# Patient Record
Sex: MALE | Race: WHITE
[De-identification: names, ages, dates, MRNs, and addresses within clinical notes are randomized per-mention and may not be internally consistent; named-entity substitution may affect disease eponyms.]

---

## 2019-04-04 ENCOUNTER — HOSPITAL ENCOUNTER (EMERGENCY)
Dept: HOSPITAL 92 - ERS | Age: 84
Discharge: HOME | End: 2019-04-04
Payer: MEDICARE

## 2019-04-04 DIAGNOSIS — Z86.73: ICD-10-CM

## 2019-04-04 DIAGNOSIS — J44.9: ICD-10-CM

## 2019-04-04 DIAGNOSIS — J44.1: Primary | ICD-10-CM

## 2019-04-04 DIAGNOSIS — Z79.899: ICD-10-CM

## 2019-04-04 DIAGNOSIS — Z79.82: ICD-10-CM

## 2019-04-04 LAB
ALBUMIN SERPL BCG-MCNC: 3.5 G/DL (ref 3.4–4.8)
ALP SERPL-CCNC: 89 U/L (ref 40–150)
ALT SERPL W P-5'-P-CCNC: 23 U/L (ref 8–55)
ANION GAP SERPL CALC-SCNC: 11 MMOL/L (ref 10–20)
AST SERPL-CCNC: 30 U/L (ref 5–34)
BASOPHILS # BLD AUTO: 0 THOU/UL (ref 0–0.2)
BASOPHILS NFR BLD AUTO: 0.2 % (ref 0–1)
BILIRUB SERPL-MCNC: 0.8 MG/DL (ref 0.2–1.2)
BUN SERPL-MCNC: 18 MG/DL (ref 8.4–25.7)
CALCIUM SERPL-MCNC: 8.9 MG/DL (ref 7.8–10.44)
CHLORIDE SERPL-SCNC: 109 MMOL/L (ref 98–107)
CO2 SERPL-SCNC: 24 MMOL/L (ref 23–31)
CREAT CL PREDICTED SERPL C-G-VRATE: 0 ML/MIN (ref 70–130)
EOSINOPHIL # BLD AUTO: 0.7 THOU/UL (ref 0–0.7)
EOSINOPHIL NFR BLD AUTO: 11.5 % (ref 0–10)
GLOBULIN SER CALC-MCNC: 3 G/DL (ref 2.4–3.5)
GLUCOSE SERPL-MCNC: 93 MG/DL (ref 83–110)
HGB BLD-MCNC: 12.9 G/DL (ref 14–18)
LYMPHOCYTES # BLD: 0.9 THOU/UL (ref 1.2–3.4)
LYMPHOCYTES NFR BLD AUTO: 15.6 % (ref 21–51)
MCH RBC QN AUTO: 30.9 PG (ref 27–31)
MCV RBC AUTO: 95.2 FL (ref 78–98)
MONOCYTES # BLD AUTO: 0.7 THOU/UL (ref 0.11–0.59)
MONOCYTES NFR BLD AUTO: 12.6 % (ref 0–10)
NEUTROPHILS # BLD AUTO: 3.4 THOU/UL (ref 1.4–6.5)
NEUTROPHILS NFR BLD AUTO: 60.1 % (ref 42–75)
PLATELET # BLD AUTO: 221 THOU/UL (ref 130–400)
POTASSIUM SERPL-SCNC: 4 MMOL/L (ref 3.5–5.1)
RBC # BLD AUTO: 4.19 MILL/UL (ref 4.7–6.1)
SODIUM SERPL-SCNC: 140 MMOL/L (ref 136–145)
WBC # BLD AUTO: 5.7 THOU/UL (ref 4.8–10.8)

## 2019-04-04 PROCEDURE — 85025 COMPLETE CBC W/AUTO DIFF WBC: CPT

## 2019-04-04 PROCEDURE — 84484 ASSAY OF TROPONIN QUANT: CPT

## 2019-04-04 PROCEDURE — 83880 ASSAY OF NATRIURETIC PEPTIDE: CPT

## 2019-04-04 PROCEDURE — 93005 ELECTROCARDIOGRAM TRACING: CPT

## 2019-04-04 PROCEDURE — 71045 X-RAY EXAM CHEST 1 VIEW: CPT

## 2019-04-04 PROCEDURE — 36415 COLL VENOUS BLD VENIPUNCTURE: CPT

## 2019-04-04 PROCEDURE — 80053 COMPREHEN METABOLIC PANEL: CPT

## 2019-04-04 NOTE — RAD
FChest AP view



INDICATION: Shortness of breath



COMPARISON: November 19, 2017



FINDINGS:There is stable cardiomegaly and chronic lung changes. No acute airspace opacity, pleural ef
fusion or pneumothorax is evident. Osseous structures are similar-appearing.



IMPRESSION: No acute cardiopulmonary abnormality.



Reported By: Tommy Girard 

Electronically Signed:  4/4/2019 10:11 AM

## 2019-06-08 ENCOUNTER — HOSPITAL ENCOUNTER (INPATIENT)
Dept: HOSPITAL 92 - ERS | Age: 84
LOS: 3 days | Discharge: HOME | DRG: 690 | End: 2019-06-11
Attending: FAMILY MEDICINE | Admitting: FAMILY MEDICINE
Payer: MEDICARE

## 2019-06-08 ENCOUNTER — HOSPITAL ENCOUNTER (EMERGENCY)
Dept: HOSPITAL 92 - ERS | Age: 84
Discharge: NURSING, REHAB OR LONG-TERM CARE | End: 2019-06-08
Payer: MEDICARE

## 2019-06-08 VITALS — BODY MASS INDEX: 20.1 KG/M2

## 2019-06-08 DIAGNOSIS — Z79.51: ICD-10-CM

## 2019-06-08 DIAGNOSIS — Z79.82: ICD-10-CM

## 2019-06-08 DIAGNOSIS — J44.9: ICD-10-CM

## 2019-06-08 DIAGNOSIS — B96.89: ICD-10-CM

## 2019-06-08 DIAGNOSIS — Z86.73: ICD-10-CM

## 2019-06-08 DIAGNOSIS — N39.0: Primary | ICD-10-CM

## 2019-06-08 DIAGNOSIS — I10: ICD-10-CM

## 2019-06-08 DIAGNOSIS — Z79.899: ICD-10-CM

## 2019-06-08 DIAGNOSIS — Z90.49: ICD-10-CM

## 2019-06-08 DIAGNOSIS — E78.5: ICD-10-CM

## 2019-06-08 LAB
ALBUMIN SERPL BCG-MCNC: 3.4 G/DL (ref 3.4–4.8)
ALBUMIN SERPL BCG-MCNC: 3.8 G/DL (ref 3.4–4.8)
ALP SERPL-CCNC: 91 U/L (ref 40–150)
ALP SERPL-CCNC: 95 U/L (ref 40–150)
ALT SERPL W P-5'-P-CCNC: 42 U/L (ref 8–55)
ALT SERPL W P-5'-P-CCNC: 52 U/L (ref 8–55)
ANION GAP SERPL CALC-SCNC: 17 MMOL/L (ref 10–20)
ANION GAP SERPL CALC-SCNC: 17 MMOL/L (ref 10–20)
AST SERPL-CCNC: 55 U/L (ref 5–34)
AST SERPL-CCNC: 68 U/L (ref 5–34)
BACTERIA UR QL AUTO: (no result) HPF
BASOPHILS # BLD AUTO: 0.1 THOU/UL (ref 0–0.2)
BASOPHILS NFR BLD AUTO: 0.4 % (ref 0–1)
BILIRUB SERPL-MCNC: 1 MG/DL (ref 0.2–1.2)
BILIRUB SERPL-MCNC: 1.3 MG/DL (ref 0.2–1.2)
BUN SERPL-MCNC: 21 MG/DL (ref 8.4–25.7)
BUN SERPL-MCNC: 22 MG/DL (ref 8.4–25.7)
CALCIUM SERPL-MCNC: 9 MG/DL (ref 7.8–10.44)
CALCIUM SERPL-MCNC: 9.1 MG/DL (ref 7.8–10.44)
CHLORIDE SERPL-SCNC: 108 MMOL/L (ref 98–107)
CHLORIDE SERPL-SCNC: 109 MMOL/L (ref 98–107)
CO2 SERPL-SCNC: 17 MMOL/L (ref 23–31)
CO2 SERPL-SCNC: 18 MMOL/L (ref 23–31)
CREAT CL PREDICTED SERPL C-G-VRATE: 0 ML/MIN (ref 70–130)
CREAT CL PREDICTED SERPL C-G-VRATE: 0 ML/MIN (ref 70–130)
CRYSTAL-AUWI FLAG: 0 (ref 0–15)
EOSINOPHIL # BLD AUTO: 0.1 THOU/UL (ref 0–0.7)
EOSINOPHIL NFR BLD AUTO: 0.4 % (ref 0–10)
GLOBULIN SER CALC-MCNC: 3.6 G/DL (ref 2.4–3.5)
GLOBULIN SER CALC-MCNC: 3.6 G/DL (ref 2.4–3.5)
GLUCOSE SERPL-MCNC: 101 MG/DL (ref 83–110)
GLUCOSE SERPL-MCNC: 112 MG/DL (ref 83–110)
HEV IGM SER QL: 0.8 (ref 0–7.99)
HGB BLD-MCNC: 13.4 G/DL (ref 14–18)
HGB BLD-MCNC: 13.9 G/DL (ref 14–18)
HYALINE CASTS #/AREA URNS LPF: (no result) LPF
LYMPHOCYTES # BLD: 0.7 THOU/UL (ref 1.2–3.4)
LYMPHOCYTES NFR BLD AUTO: 4.1 % (ref 21–51)
MCH RBC QN AUTO: 31.8 PG (ref 27–31)
MCH RBC QN AUTO: 32.5 PG (ref 27–31)
MCV RBC AUTO: 95.7 FL (ref 78–98)
MCV RBC AUTO: 95.8 FL (ref 78–98)
MDIFF COMPLETE?: YES
MONOCYTES # BLD AUTO: 1.4 THOU/UL (ref 0.11–0.59)
MONOCYTES NFR BLD AUTO: 8 % (ref 0–10)
NEUTROPHILS # BLD AUTO: 14.9 THOU/UL (ref 1.4–6.5)
NEUTROPHILS NFR BLD AUTO: 87.1 % (ref 42–75)
PATHC CAST-AUWI FLAG: 0.81 (ref 0–2.49)
PLATELET # BLD AUTO: 207 THOU/UL (ref 130–400)
PLATELET # BLD AUTO: 208 THOU/UL (ref 130–400)
POLYCHROMASIA BLD QL SMEAR: (no result) (100X)
POTASSIUM SERPL-SCNC: 3.8 MMOL/L (ref 3.5–5.1)
POTASSIUM SERPL-SCNC: 4.1 MMOL/L (ref 3.5–5.1)
PROT UR STRIP.AUTO-MCNC: 30 MG/DL
RBC # BLD AUTO: 4.21 MILL/UL (ref 4.7–6.1)
RBC # BLD AUTO: 4.27 MILL/UL (ref 4.7–6.1)
RBC UR QL AUTO: (no result) HPF (ref 0–3)
SODIUM SERPL-SCNC: 139 MMOL/L (ref 136–145)
SODIUM SERPL-SCNC: 139 MMOL/L (ref 136–145)
SP GR UR STRIP: 1.02 (ref 1–1.04)
SPERM-AUWI FLAG: 0 (ref 0–9.9)
WBC # BLD AUTO: 17.1 THOU/UL (ref 4.8–10.8)
WBC # BLD AUTO: 21.8 THOU/UL (ref 4.8–10.8)
YEAST-AUWI FLAG: 0 (ref 0–25)

## 2019-06-08 PROCEDURE — 87186 SC STD MICRODIL/AGAR DIL: CPT

## 2019-06-08 PROCEDURE — S0028 INJECTION, FAMOTIDINE, 20 MG: HCPCS

## 2019-06-08 PROCEDURE — 36415 COLL VENOUS BLD VENIPUNCTURE: CPT

## 2019-06-08 PROCEDURE — 96360 HYDRATION IV INFUSION INIT: CPT

## 2019-06-08 PROCEDURE — 87040 BLOOD CULTURE FOR BACTERIA: CPT

## 2019-06-08 PROCEDURE — G0009 ADMIN PNEUMOCOCCAL VACCINE: HCPCS

## 2019-06-08 PROCEDURE — 90670 PCV13 VACCINE IM: CPT

## 2019-06-08 PROCEDURE — 71045 X-RAY EXAM CHEST 1 VIEW: CPT

## 2019-06-08 PROCEDURE — 83605 ASSAY OF LACTIC ACID: CPT

## 2019-06-08 PROCEDURE — 87086 URINE CULTURE/COLONY COUNT: CPT

## 2019-06-08 PROCEDURE — 80048 BASIC METABOLIC PNL TOTAL CA: CPT

## 2019-06-08 PROCEDURE — 87077 CULTURE AEROBIC IDENTIFY: CPT

## 2019-06-08 PROCEDURE — 93005 ELECTROCARDIOGRAM TRACING: CPT

## 2019-06-08 PROCEDURE — 90471 IMMUNIZATION ADMIN: CPT

## 2019-06-08 PROCEDURE — 81003 URINALYSIS AUTO W/O SCOPE: CPT

## 2019-06-08 PROCEDURE — 80053 COMPREHEN METABOLIC PANEL: CPT

## 2019-06-08 PROCEDURE — 81015 MICROSCOPIC EXAM OF URINE: CPT

## 2019-06-08 PROCEDURE — 84484 ASSAY OF TROPONIN QUANT: CPT

## 2019-06-08 PROCEDURE — 85025 COMPLETE CBC W/AUTO DIFF WBC: CPT

## 2019-06-08 PROCEDURE — 96372 THER/PROPH/DIAG INJ SC/IM: CPT

## 2019-06-08 PROCEDURE — 70450 CT HEAD/BRAIN W/O DYE: CPT

## 2019-06-08 NOTE — CT
CT BRAIN NONCONTRAST:



DATE:

6/8/2019



HISTORY:

89-year-old male status post head trauma from fall



COMPARISON:

6/8/2019 12:54 PM, approximately 8 hours ago



FINDINGS:

There is no evidence of acute intra-axial or extra-axial hemorrhage. There is no midline shift or any
 other mass effect. There is no extra-axial fluid collection. There is no evidence of obstructive

hydrocephalus. Calvarium is intact. There is diffuse brain parenchymal volume loss. There are low att
enuation areas in the white matter. These are nonspecific, but in a patient of this age, they are

probably chronic ischemic white matter changes due to microvascular atherosclerosis. Again noted are 
the moderate sized patchy regions of encephalomalacia and gliosis in the right anterior upper

frontal lobe cortex, underlying subcortical white matter, and adjacent corona radiata and centrum nicole
iovale. No interval change overall.



IMPRESSION:

1) No acute intracranial findings.

2) involutional changes and chronic ischemic white matter changes.

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



Reported By: Del Amado 

Electronically Signed:  6/8/2019 9:34 PM

## 2019-06-08 NOTE — CT
EXAM:

Brain CT scan Without contrast:



HISTORY:

Dizziness when walking



COMPARISON:

3/22/2017



FINDINGS:

Moderate sinus mucosal disease.

Old right frontal infarct changes. Stable.

Atrophy and chronic white matter ischemic change.

No focal mass or midline shift.

No intra or extra-axial hemorrhage.

The visualized sinuses and mastoids are clear of acute process.



IMPRESSION:

No mass or bleed or other significant acute intracranial process. Stable from prior study.



Reported By: Navin Johnson 

Electronically Signed:  6/8/2019 1:01 PM

## 2019-06-08 NOTE — RAD
RADIOGRAPH CHEST 1 VIEW:



DATE:

6/8/2019



HISTORY:

89-year-old male status post acute chest trauma from fall



FINDINGS:

There are no airspace densities, pulmonary edema, pneumothorax, or cardiomegaly. The lateral costophr
enic angles are sharp.



IMPRESSION:

No acute cardiopulmonary findings.



Reported By: Del Amado 

Electronically Signed:  6/8/2019 10:06 PM

## 2019-06-09 LAB
ANION GAP SERPL CALC-SCNC: 13 MMOL/L (ref 10–20)
BASOPHILS # BLD AUTO: 0 THOU/UL (ref 0–0.2)
BASOPHILS NFR BLD AUTO: 0.1 % (ref 0–1)
BUN SERPL-MCNC: 17 MG/DL (ref 8.4–25.7)
CALCIUM SERPL-MCNC: 8.6 MG/DL (ref 7.8–10.44)
CHLORIDE SERPL-SCNC: 109 MMOL/L (ref 98–107)
CO2 SERPL-SCNC: 21 MMOL/L (ref 23–31)
CREAT CL PREDICTED SERPL C-G-VRATE: 43 ML/MIN (ref 70–130)
EOSINOPHIL # BLD AUTO: 0.1 THOU/UL (ref 0–0.7)
EOSINOPHIL NFR BLD AUTO: 0.5 % (ref 0–10)
GLUCOSE SERPL-MCNC: 109 MG/DL (ref 83–110)
HGB BLD-MCNC: 12.2 G/DL (ref 14–18)
LYMPHOCYTES # BLD: 0.8 THOU/UL (ref 1.2–3.4)
LYMPHOCYTES NFR BLD AUTO: 5.4 % (ref 21–51)
MCH RBC QN AUTO: 33.1 PG (ref 27–31)
MCV RBC AUTO: 95.2 FL (ref 78–98)
MONOCYTES # BLD AUTO: 1.4 THOU/UL (ref 0.11–0.59)
MONOCYTES NFR BLD AUTO: 9.2 % (ref 0–10)
NEUTROPHILS # BLD AUTO: 12.5 THOU/UL (ref 1.4–6.5)
NEUTROPHILS NFR BLD AUTO: 84.9 % (ref 42–75)
PLATELET # BLD AUTO: 184 THOU/UL (ref 130–400)
POTASSIUM SERPL-SCNC: 3.5 MMOL/L (ref 3.5–5.1)
RBC # BLD AUTO: 3.68 MILL/UL (ref 4.7–6.1)
SODIUM SERPL-SCNC: 139 MMOL/L (ref 136–145)
WBC # BLD AUTO: 14.8 THOU/UL (ref 4.8–10.8)

## 2019-06-09 RX ADMIN — MULTIPLE VITAMINS W/ MINERALS TAB SCH TAB: TAB at 08:04

## 2019-06-09 RX ADMIN — ASPIRIN 81 MG CHEWABLE TABLET SCH MG: 81 TABLET CHEWABLE at 08:04

## 2019-06-09 RX ADMIN — MOMETASONE FUROATE AND FORMOTEROL FUMARATE DIHYDRATE SCH PUFF: 200; 5 AEROSOL RESPIRATORY (INHALATION) at 10:24

## 2019-06-09 RX ADMIN — MOMETASONE FUROATE AND FORMOTEROL FUMARATE DIHYDRATE SCH PUFF: 200; 5 AEROSOL RESPIRATORY (INHALATION) at 18:52

## 2019-06-09 RX ADMIN — FAMOTIDINE SCH MG: 10 INJECTION, SOLUTION INTRAVENOUS at 08:04

## 2019-06-09 NOTE — HP
TIME:  11:50 p.m.



HISTORY OF PRESENT ILLNESS:  This is an 89-year-old white male with history of COPD,

hypertension, and hyperlipidemia, who presents with frequent falls and weakness.  He

is followed by Dr. Miquel Caldwell in the office.  He has had some previous

hospitalizations for COPD.  He was seen earlier in the ER due to weakness.  He was

diagnosed with the UTI and was discharged.  However, he continued to fall throughout

the day and was complaining of marked weakness and unsteadiness.  He was brought

back to the ER and is being admitted for possible urosepsis.  His UA was grossly

abnormal with 4+ bacteria.  Presently, he is stable.  He does not complain of any

fever, nausea, or vomiting.  He just simply complains of feeling very weak. 



PAST MEDICAL HISTORY:  Allergic rhinitis, history of stroke, and COPD.



PAST SURGICAL HISTORY:  Include hernia repair in 1955, tonsillectomy, and

laparoscopic cholecystectomy in 2016. 



FAMILY HISTORY:  Unknown.



SOCIAL HISTORY:  He lives in assisted living.  He is single.  He has children.  He

does not smoke and does not drink at this time.  I believe he was a previous smoker. 



MEDICATIONS:  Include multivitamin, aspirin 81 daily, amlodipine 5 daily,

simvastatin 20 daily, Advair 250/50 b.i.d., DuoNeb q.6h. p.r.n. 



ALLERGIES:  NONE.



REVIEW OF SYSTEMS:  As above.



PHYSICAL EXAMINATION:

VITAL SIGNS:  Blood pressure __________/89, pulse 80, respirations 14. 

GENERAL:  The patient in no acute distress at this time. 

HEENT:  Clear. 

NECK:  Supple. 

HEART:  Regular rate and rhythm. 

LUNGS:  Clear. 

ABDOMEN:  Soft, nontender.  No CVA tenderness. 

EXTREMITIES:  With no edema.



LABORATORY DATA:  Urine with specific gravity of 1.022, large leukocyte esterase, 4+

bacteria and TNTC wbc's.  White count 17.1, H and H 13 and 40, platelet 208.  Sodium

139, potassium 3.8, creatinine 1.20, BUN 21.  Liver functions normal. 



ASSESSMENT:  

1. Urosepsis, cultures pending.

2. History of chronic obstructive pulmonary disease.

3. History of cerebrovascular accident.

4. Weakness.



PLAN:  

1. Admit.

2. Slowly hydrate.

3. IV Levaquin.

4. Blood and urine culture.  We will continue to follow.







Job ID:  [id number]

## 2019-06-09 NOTE — PRG
DATE OF SERVICE:  06/09/2019



SUBJECTIVE:  The patient is feeling somewhat better today.  He does complain of a

tender forehead from his fall.  He did eat 50% of his breakfast. 



OBJECTIVE:  VITAL SIGNS:  Temperature 98.4, pulse 82, respirations 16, and blood

pressure 131/62. 

HEART:  Regular rate and rhythm. 

LUNGS:  Clear. 

ABDOMEN:  Soft, nontender.



LABORATORY DATA:  White count decreased from 17.1 to 14.8, H and H of 12 and 35.

Creatinine 1.09, BUN 17, and blood sugar 109. 



ASSESSMENT:  

1. Urosepsis, cultures are pending.

2. History of chronic obstructive pulmonary disease.

3. History of cerebrovascular accident.

4. Weakness.



PLAN:  

1. Continue to hydrate.

2. IV Levaquin.

3. Physical therapy consult.

4. We will continue to follow.







Job ID:  [id number]

## 2019-06-10 LAB
ANION GAP SERPL CALC-SCNC: 12 MMOL/L (ref 10–20)
BASOPHILS # BLD AUTO: 0 THOU/UL (ref 0–0.2)
BASOPHILS NFR BLD AUTO: 0 % (ref 0–1)
BUN SERPL-MCNC: 18 MG/DL (ref 8.4–25.7)
CALCIUM SERPL-MCNC: 8.3 MG/DL (ref 7.8–10.44)
CHLORIDE SERPL-SCNC: 112 MMOL/L (ref 98–107)
CO2 SERPL-SCNC: 19 MMOL/L (ref 23–31)
CREAT CL PREDICTED SERPL C-G-VRATE: 43 ML/MIN (ref 70–130)
EOSINOPHIL # BLD AUTO: 0.6 THOU/UL (ref 0–0.7)
EOSINOPHIL NFR BLD AUTO: 6.8 % (ref 0–10)
GLUCOSE SERPL-MCNC: 88 MG/DL (ref 83–110)
HGB BLD-MCNC: 12.2 G/DL (ref 14–18)
LYMPHOCYTES # BLD: 0.9 THOU/UL (ref 1.2–3.4)
LYMPHOCYTES NFR BLD AUTO: 9.7 % (ref 21–51)
MCH RBC QN AUTO: 32.9 PG (ref 27–31)
MCV RBC AUTO: 96.2 FL (ref 78–98)
MONOCYTES # BLD AUTO: 0.9 THOU/UL (ref 0.11–0.59)
MONOCYTES NFR BLD AUTO: 10.3 % (ref 0–10)
NEUTROPHILS # BLD AUTO: 6.7 THOU/UL (ref 1.4–6.5)
NEUTROPHILS NFR BLD AUTO: 73.2 % (ref 42–75)
PLATELET # BLD AUTO: 191 THOU/UL (ref 130–400)
POTASSIUM SERPL-SCNC: 3.6 MMOL/L (ref 3.5–5.1)
RBC # BLD AUTO: 3.7 MILL/UL (ref 4.7–6.1)
SODIUM SERPL-SCNC: 139 MMOL/L (ref 136–145)
WBC # BLD AUTO: 9.2 THOU/UL (ref 4.8–10.8)

## 2019-06-10 RX ADMIN — MULTIPLE VITAMINS W/ MINERALS TAB SCH TAB: TAB at 09:20

## 2019-06-10 RX ADMIN — FAMOTIDINE SCH MG: 10 INJECTION, SOLUTION INTRAVENOUS at 09:29

## 2019-06-10 RX ADMIN — ASPIRIN 81 MG CHEWABLE TABLET SCH MG: 81 TABLET CHEWABLE at 09:21

## 2019-06-10 RX ADMIN — MOMETASONE FUROATE AND FORMOTEROL FUMARATE DIHYDRATE SCH PUFF: 200; 5 AEROSOL RESPIRATORY (INHALATION) at 19:11

## 2019-06-10 RX ADMIN — MOMETASONE FUROATE AND FORMOTEROL FUMARATE DIHYDRATE SCH PUFF: 200; 5 AEROSOL RESPIRATORY (INHALATION) at 08:16

## 2019-06-10 NOTE — PRG
DATE OF SERVICE:  06/10/2019



SUBJECTIVE:  Mr. Knapp is resting well.  He is feeling better.  He has been

afebrile. 



OBJECTIVE:  VITAL SIGNS:  Temperature is 98.0, /86, O2 sats 95% on room air. 

GENERAL:  Alert and active, in no distress. 

LUNGS:  Clear. 

HEART:  Reveals a regular rate and rhythm.  No murmurs, gallops, or rubs.



LABORATORY DATA:  Hemoglobin 12.2, hematocrit 34.6, white count 9.2.  Electrolytes;

sodium 139, potassium 3.6, chloride 112, CO2 of 19, BUN 18, creatinine 1.09.  Blood

cultures negative thus far. 



IMPRESSION:  Apparent pyelonephritis, urinary tract infection.



PLAN:  Continue current IV antibiotics.  Possible discharge tomorrow.







Job ID:  [id number]

## 2019-06-11 VITALS — SYSTOLIC BLOOD PRESSURE: 154 MMHG | TEMPERATURE: 97.8 F | DIASTOLIC BLOOD PRESSURE: 77 MMHG

## 2019-06-11 RX ADMIN — MOMETASONE FUROATE AND FORMOTEROL FUMARATE DIHYDRATE SCH PUFF: 200; 5 AEROSOL RESPIRATORY (INHALATION) at 06:41

## 2019-06-11 RX ADMIN — MULTIPLE VITAMINS W/ MINERALS TAB SCH TAB: TAB at 08:08

## 2019-06-11 RX ADMIN — FAMOTIDINE SCH MG: 10 INJECTION, SOLUTION INTRAVENOUS at 08:08

## 2019-06-11 RX ADMIN — ASPIRIN 81 MG CHEWABLE TABLET SCH MG: 81 TABLET CHEWABLE at 08:08

## 2019-06-11 NOTE — PRG
DATE OF SERVICE:  06/11/2019



SUBJECTIVE:  Mr. Knapp is doing well.  He reports no fever, no nausea, vomiting,

or diarrhea. 



OBJECTIVE:  VITAL SIGNS:  Temperature 98.0, /63.



LABORATORY DATA:  He has urine culture that is growing Enterobacter cloacae with

multiple sensitivities. 



IMPRESSION:  Urinary tract infection, now stabilized.  He can be discharged home.

He will follow up with me in 1 week. 







Job ID:  [id number]

## 2019-06-12 NOTE — DIS
DATE OF ADMISSION:  06/08/2019



DATE OF DISCHARGE:  06/11/2019



DISCHARGE DIAGNOSES:  Pyelonephritis/urinary tract infection.



HOSPITAL SUMMARY:  The patient is an 89-year-old male who has a previous CVA,

admitted to the emergency room with fever, had some frequent falls, who was seen in

the ER that day.  He came back with further fever.  He was treated with IV

antibiotics consisting of Levaquin.  Urine cultures obtained which did grow out

Enterobacter cloacae sensitive to Levaquin.  He has remained afebrile in the

hospital.  He was able to tolerate all oral intake.  He was discharged home on

06/11/2019, in good condition. 



DISCHARGE MEDICATIONS:  

1. Amlodipine 5 mg daily.

2. Aspirin 81 mg daily.

3. Fluticasone nasal spray b.i.d.

4. Mometasone formoterol 200/5 b.i.d.

5. Simvastatin 10 mg daily.

6. Tylenol p.r.n. pain. 

In addition, he was placed on Levaquin 500 mg daily.



FOLLOWUP:  He will follow up with me in 1 week.







Job ID:  [id number]

## 2019-07-24 ENCOUNTER — HOSPITAL ENCOUNTER (OUTPATIENT)
Dept: HOSPITAL 92 - ERS | Age: 84
End: 2019-07-24
Attending: INTERNAL MEDICINE
Payer: MEDICARE

## 2019-07-24 DIAGNOSIS — K22.2: ICD-10-CM

## 2019-07-24 DIAGNOSIS — E78.5: ICD-10-CM

## 2019-07-24 DIAGNOSIS — T18.128A: Primary | ICD-10-CM

## 2019-07-24 DIAGNOSIS — J44.9: ICD-10-CM

## 2019-07-24 DIAGNOSIS — K44.9: ICD-10-CM

## 2019-07-24 DIAGNOSIS — I10: ICD-10-CM

## 2019-07-24 DIAGNOSIS — Z86.73: ICD-10-CM

## 2019-07-24 LAB
ALBUMIN SERPL BCG-MCNC: 3.9 G/DL (ref 3.4–4.8)
ALP SERPL-CCNC: 91 U/L (ref 40–150)
ALT SERPL W P-5'-P-CCNC: 34 U/L (ref 8–55)
ANION GAP SERPL CALC-SCNC: 12 MMOL/L (ref 10–20)
APTT PPP: 32.2 SEC (ref 22.9–36.1)
AST SERPL-CCNC: 37 U/L (ref 5–34)
BASOPHILS # BLD AUTO: 0 THOU/UL (ref 0–0.2)
BASOPHILS NFR BLD AUTO: 0 % (ref 0–1)
BILIRUB SERPL-MCNC: 0.4 MG/DL (ref 0.2–1.2)
BUN SERPL-MCNC: 25 MG/DL (ref 8.4–25.7)
CALCIUM SERPL-MCNC: 9.8 MG/DL (ref 7.8–10.44)
CHLORIDE SERPL-SCNC: 109 MMOL/L (ref 98–107)
CO2 SERPL-SCNC: 24 MMOL/L (ref 23–31)
CREAT CL PREDICTED SERPL C-G-VRATE: 0 ML/MIN (ref 70–130)
EOSINOPHIL # BLD AUTO: 0.2 THOU/UL (ref 0–0.7)
EOSINOPHIL NFR BLD AUTO: 1.4 % (ref 0–10)
GLOBULIN SER CALC-MCNC: 3.8 G/DL (ref 2.4–3.5)
GLUCOSE SERPL-MCNC: 167 MG/DL (ref 83–110)
HGB BLD-MCNC: 13.6 G/DL (ref 14–18)
INR PPP: 1
LYMPHOCYTES # BLD: 0.7 THOU/UL (ref 1.2–3.4)
LYMPHOCYTES NFR BLD AUTO: 3.9 % (ref 21–51)
MCH RBC QN AUTO: 32.5 PG (ref 27–31)
MCV RBC AUTO: 96 FL (ref 78–98)
MONOCYTES # BLD AUTO: 0.9 THOU/UL (ref 0.11–0.59)
MONOCYTES NFR BLD AUTO: 5.1 % (ref 0–10)
NEUTROPHILS # BLD AUTO: 15.1 THOU/UL (ref 1.4–6.5)
NEUTROPHILS NFR BLD AUTO: 89.6 % (ref 42–75)
PLATELET # BLD AUTO: 226 THOU/UL (ref 130–400)
POTASSIUM SERPL-SCNC: 3.9 MMOL/L (ref 3.5–5.1)
PROT UR STRIP.AUTO-MCNC: 20 MG/DL
PROTHROMBIN TIME: 13.4 SEC (ref 12–14.7)
RBC # BLD AUTO: 4.19 MILL/UL (ref 4.7–6.1)
RBC UR QL AUTO: (no result) HPF (ref 0–3)
SODIUM SERPL-SCNC: 140 MMOL/L (ref 136–145)
WBC # BLD AUTO: 16.9 THOU/UL (ref 4.8–10.8)
WBC UR QL AUTO: (no result) HPF (ref 0–3)

## 2019-07-24 PROCEDURE — 85025 COMPLETE CBC W/AUTO DIFF WBC: CPT

## 2019-07-24 PROCEDURE — 0DC38ZZ EXTIRPATION OF MATTER FROM LOWER ESOPHAGUS, VIA NATURAL OR ARTIFICIAL OPENING ENDOSCOPIC: ICD-10-PCS | Performed by: INTERNAL MEDICINE

## 2019-07-24 PROCEDURE — 99284 EMERGENCY DEPT VISIT MOD MDM: CPT

## 2019-07-24 PROCEDURE — 80053 COMPREHEN METABOLIC PANEL: CPT

## 2019-07-24 PROCEDURE — 0D738ZZ DILATION OF LOWER ESOPHAGUS, VIA NATURAL OR ARTIFICIAL OPENING ENDOSCOPIC: ICD-10-PCS | Performed by: INTERNAL MEDICINE

## 2019-07-24 PROCEDURE — 96372 THER/PROPH/DIAG INJ SC/IM: CPT

## 2019-07-24 PROCEDURE — 81001 URINALYSIS AUTO W/SCOPE: CPT

## 2019-07-24 PROCEDURE — 36415 COLL VENOUS BLD VENIPUNCTURE: CPT

## 2019-07-24 PROCEDURE — 85610 PROTHROMBIN TIME: CPT

## 2019-07-24 PROCEDURE — 43247 EGD REMOVE FOREIGN BODY: CPT

## 2019-07-24 PROCEDURE — 85730 THROMBOPLASTIN TIME PARTIAL: CPT

## 2019-07-24 PROCEDURE — 43249 ESOPH EGD DILATION <30 MM: CPT

## 2019-07-24 PROCEDURE — 71046 X-RAY EXAM CHEST 2 VIEWS: CPT

## 2019-07-24 PROCEDURE — 93005 ELECTROCARDIOGRAM TRACING: CPT

## 2019-07-24 NOTE — RAD
TWO VIEW CHEST WITH PA AND LATERAL:

7/24/19

 

HISTORY: 

Feels like something stuck in throat. 

 

Lungs are clear.  Heart and mediastinum unremarkable. Vascularity is normal. Apical pleural thickenin
g is stable when compared to 7/9/15 exam. 

 

Mild degenerative changes in the spine. Mild aortic calcifications. 

 

IMPRESSION: 

No acute finding or interval change. 

 

POS: OFF

## 2019-07-25 NOTE — OP
DATE OF PROCEDURE:  07/24/2019



PROCEDURE PERFORMED:  Esophagogastroduodenoscopy with foreign body removal, balloon

dilation of esophageal stricture. 



INDICATIONS FOR PROCEDURE:  Dysphagia, esophageal food bolus impaction.



DESCRIPTION OF PROCEDURE:  After the risks and benefits of the procedure were

explained to the patient including risks of bleeding, infection, perforation,

reactions to anesthesia, aspiration and/or pain, informed consent was obtained. 



DESCRIPTION OF PROCEDURE:  The patient was then taken to the endoscopy suite, where

general anesthesia was administered with endotracheal tube intubation with

Anesthesia support.  Once the patient was intubated and sedated, he was placed in

the left lateral decubitus position in preparation for the upper endoscopy.  Once in

adequate position, the standard gastroscope was introduced into the mouth with

intubation of the esophagus, stomach, and the proximal small intestines with the

findings listed below.  The patient tolerated the procedure well with no immediate

perioperative complications.  Upon conclusion of the procedure, all equipment was

removed from the patient and he was transferred to PACU in satisfactory condition. 



FINDINGS:  Esophagus:  Normal-appearing mucosa was seen in the proximal and mid

esophagus, however, a large white-appearing food bolus was seen in the distal

esophagus just proximal to the gastroesophageal junction.  Using a combination of

light pressure and use of the irrigation on the scope, the food bolus was able to be

advanced into the stomach where it was adequately suctioned out.  Upon further

evaluation of the distal esophagus, a benign intrinsic esophageal stricture was seen

just proximal to the gastroesophageal junction.  That was easily traversed with the

standard gastroscope.  A small hiatal hernia was also seen during examination of the

distal esophagus.  There was minimal amount of increased mucosal erythema in the

distal esophagus, although there was the presence of 2-3 submucosal hematomas.

Given the presence of this esophageal stricture, a CRE TTS esophageal balloon was

advanced through the endoscope and the stricture was dilated initially at 12 mm, but

successfully advanced to 15 mm in diameter.  After completion of this maneuver, the

balloon was deflated and evaluation of stricture was noted with a small esophageal

tear noted along the 4 o'clock position of the esophageal stricture with a minimal

amount of heme.  Otherwise, there was no evidence of ulceration or mass lesions in

the distal esophagus. 



Stomach:  Normal-appearing mucosa was seen in the gastric cardia, fundus, and body.

However, multiple small petechiae appearing blood clots were seen in the gastric

antrum and along the incisura.  Upon irrigation of these small blood clots, there

was no underlying pathology.  On gastric retroflexion, there was a small hiatal

hernia noted along with some of the remainder of the esophageal food bolus.

Otherwise, there was no evidence of erosions, ulcerations, mass lesions, or

active/recent bleeding. 



Duodenum:  Normal-appearing mucosa was seen in both the duodenal bulb and second

portion of the duodenum.  There was no evidence of erosions, ulcerations, mass

lesions, or active/recent bleeding. 



IMPRESSION:  

1. Esophageal food bolus impaction seen in the distal esophagus and successfully

advanced into the stomach using gentle pressure and irrigation. 

2. Moderate grade 2 distal esophageal stricture consistent with a Schatzki ring,

successfully dilated to 15 mm. 

3. Small hiatal hernia.

4. Petechiae looking clots within the gastric antrum/incisura consistent with

NSAID-gastritis. 

5. Duodenal ulcers seen on prior examination were not seen during this examination.



RECOMMENDATIONS:  

1. We would place the patient on a liquid diet over the next 24 to 48 hours and

advance to a soft mechanical diet until seen in the GI clinic. 

2. We would start the patient on omeprazole 40 mg daily as part of acid reflux

measures and prevention of further strictures. 

3. We would have the patient follow up in the GI Clinic in 3 weeks for re-evaluation

of dysphagia and possible repeat procedure with dilation at that time. 

4. We would transfer the patient back to the ER for further evaluation of the

elevated white blood cell count including a urinalysis given his recent history of

urosepsis. 

We will continue to follow.  Please call with any questions.





Job ID:  [id number]

## 2019-07-25 NOTE — CON
DATE OF CONSULTATION:  07/24/2019



REASON FOR CONSULTATION:  Esophageal food bolus impaction.



CONSULTING PHYSICIAN:  Dr. Winters.



HISTORY OF PRESENT ILLNESS:  The patient is an 89-year-old male with past medical

history of COPD, hypertension, hyperlipidemia, recurrent urinary tract infections,

and TIA/cerebrovascular accident with residual left-sided deficits and esophageal

stricture with food impaction in 2017, presenting with complaints of dysphagia.  He

states for the last 1 to 2 months he has been having intermittent episodes of the

sensation that food is getting stuck at the level of the xiphoid process that would

occur primarily with ingested cold foods.  This would occur with both solid and

liquid foods and would occur for 3-5 days at a time and spontaneously resolve.

However, earlier today while the patient was eating both fish and ice cream, during

the ingestion of the eye stream, he noted that the sensation of pressure was

increasing at the level of the xiphoid process.  He tried to drink water and was

unsuccessful in terms of relieving this pressure, but instead vomited up a small

amount of the water.  With repeated attempts to "swallow hard" that were

unsuccessful, it prompted him to seek healthcare assistance in the [facility] ER.

While in the ER, glucagon and ingestion of water was attempted and were both

unsuccessful.  Otherwise, he currently denies any fevers, chills, hematemesis,

melena, hematochezia, or weight loss. 



Of note, the patient had an upper endoscopy performed on 11/28/2017, for a food

bolus impaction with the findings of a higher grade distal esophageal stricture just

proximal to the GE junction that was dilated to 16.5 mm.  It was recommend that he

follow up in the GI Clinic, but did not follow up.  He was also placed on PPI

therapy at that time, but this was discontinued at an unknown time ago due to the

patient feeling better. 



REVIEW OF SYSTEMS:  A 10-category review of systems was obtained with all responses

negative except for the pertinent positives as listed in HPI. 



PAST MEDICAL HISTORY:  As per HPI.



PAST SURGICAL HISTORY:  Cholecystectomy, tonsillectomy, and ventral hernia repair.



FAMILY HISTORY:  Breast cancer diagnosed in his mother, no stated GI malignancies.



SOCIAL HISTORY:  Denies any tobacco, alcohol, or illicit drug use.



OUTPATIENT MEDICATIONS:  Reviewed.



ALLERGIES:  NO KNOWN DRUG ALLERGIES.



PHYSICAL EXAMINATION:

VITAL SIGNS:  Temperature 97.9, pulse 66, blood pressure 192/96, respiratory rate

21, saturating 96% on room air. 

GENERAL:  The patient was lying in bed, in no acute distress.  Alert and oriented

x4, although somewhat hard of hearing.  Intermittently spitting up into the emesis

bag at bedside. 

HEENT:  Neck is supple.  Normocephalic, atraumatic.  No scleral icterus or JVD

noted. 

CARDIOVASCULAR:  Regular rate and rhythm with no discernible murmurs, gallops, or

rubs. 

RESPIRATORY:  Clear to auscultation bilaterally with no discernible wheezes or

rales. 

ABDOMEN:  Normoactive bowel sounds.  Soft, nontender, nondistended. 

EXTREMITIES:  No cyanosis, clubbing, or edema.



LABORATORY DATA:  CBC with a white blood cell count of 16.9, hemoglobin 13.6,

hematocrit 40.2, glucose 226.  INR 1.0.  Comprehensive medical panel still pending

at the time of this evaluation. 



IMAGING STUDIES:  Chest x-ray obtained on 07/24/2019, showed no acute cardio or

pulmonary process. 



ASSESSMENT AND PLAN:  The patient is an 89-year-old male with past medical history

of chronic obstructive pulmonary disease, hypertension, transient ischemic

attack/cerebrovascular accident, hyperlipidemia, recurrent urinary tract infections,

and esophageal stricture, presenting with food bolus impaction, most likely due to

repeat esophageal stricture. 



Esophageal food bolus impaction:  The patient is presenting with complaints of

dysphagia characterized as a sensation that food is getting stuck at the xiphoid

process that occurred at noon earlier today while eating fish and ice cream.

Multiple attempts to relieve the obstruction with swallowing hard and ingestion of

water were unsuccessful.  Attempts in the ER with glucagon and repeat ingestion of

water were also unsuccessful.  The patient does have a history of an esophageal

stricture/Schatzki ring on prior endoscopy with that being the most likely etiology

for the food impaction at this time. 



RECOMMENDATIONS:  

1. Would continue n.p.o. status in anticipation for upper endoscopy urgently tonight.

2. Would proceed with EGD for intraluminal evaluation and removal of the foreign

body. 

3. Would place the patient on PPI 40 mg daily indefinitely given the history of

esophageal strictures and more likely reason for acid reflux as the causative

mechanism. 

4. May need continued workup for elevated white blood cell count, especially given

recent hospitalization for urosepsis. 







Job ID:  [id number]